# Patient Record
Sex: MALE | Race: WHITE
[De-identification: names, ages, dates, MRNs, and addresses within clinical notes are randomized per-mention and may not be internally consistent; named-entity substitution may affect disease eponyms.]

---

## 2018-12-13 ENCOUNTER — HOSPITAL ENCOUNTER (OUTPATIENT)
Dept: HOSPITAL 92 - LABBT | Age: 68
Discharge: HOME | End: 2018-12-13
Attending: SURGERY
Payer: MEDICARE

## 2018-12-13 DIAGNOSIS — N40.0: ICD-10-CM

## 2018-12-13 DIAGNOSIS — K40.90: ICD-10-CM

## 2018-12-13 DIAGNOSIS — Z01.818: Primary | ICD-10-CM

## 2018-12-13 DIAGNOSIS — Q53.9: ICD-10-CM

## 2018-12-13 LAB
ALBUMIN SERPL BCG-MCNC: 4.5 G/DL (ref 3.4–4.8)
ALP SERPL-CCNC: 65 U/L (ref 40–150)
ALT SERPL W P-5'-P-CCNC: 12 U/L (ref 8–55)
ANION GAP SERPL CALC-SCNC: 14 MMOL/L (ref 10–20)
AST SERPL-CCNC: 18 U/L (ref 5–34)
BASOPHILS # BLD AUTO: 0 THOU/UL (ref 0–0.2)
BASOPHILS NFR BLD AUTO: 0.6 % (ref 0–1)
BILIRUB SERPL-MCNC: 0.4 MG/DL (ref 0.2–1.2)
BUN SERPL-MCNC: 14 MG/DL (ref 8.4–25.7)
CALCIUM SERPL-MCNC: 9.8 MG/DL (ref 7.8–10.44)
CHLORIDE SERPL-SCNC: 106 MMOL/L (ref 98–107)
CO2 SERPL-SCNC: 25 MMOL/L (ref 23–31)
CREAT CL PREDICTED SERPL C-G-VRATE: 0 ML/MIN (ref 70–130)
EOSINOPHIL # BLD AUTO: 0.1 THOU/UL (ref 0–0.7)
EOSINOPHIL NFR BLD AUTO: 2.7 % (ref 0–10)
GLOBULIN SER CALC-MCNC: 2.5 G/DL (ref 2.4–3.5)
GLUCOSE SERPL-MCNC: 98 MG/DL (ref 80–115)
HGB BLD-MCNC: 14.4 G/DL (ref 14–18)
LYMPHOCYTES # BLD: 1.5 THOU/UL (ref 1.2–3.4)
LYMPHOCYTES NFR BLD AUTO: 32.6 % (ref 21–51)
MCH RBC QN AUTO: 32.3 PG (ref 27–31)
MCV RBC AUTO: 96.1 FL (ref 78–98)
MONOCYTES # BLD AUTO: 0.5 THOU/UL (ref 0.11–0.59)
MONOCYTES NFR BLD AUTO: 10.6 % (ref 0–10)
NEUTROPHILS # BLD AUTO: 2.5 THOU/UL (ref 1.4–6.5)
NEUTROPHILS NFR BLD AUTO: 53.5 % (ref 42–75)
PLATELET # BLD AUTO: 217 THOU/UL (ref 130–400)
POTASSIUM SERPL-SCNC: 4.7 MMOL/L (ref 3.5–5.1)
RBC # BLD AUTO: 4.46 MILL/UL (ref 4.7–6.1)
SODIUM SERPL-SCNC: 140 MMOL/L (ref 136–145)
WBC # BLD AUTO: 4.6 THOU/UL (ref 4.8–10.8)

## 2018-12-13 PROCEDURE — 93010 ELECTROCARDIOGRAM REPORT: CPT

## 2018-12-13 PROCEDURE — 80053 COMPREHEN METABOLIC PANEL: CPT

## 2018-12-13 PROCEDURE — 85025 COMPLETE CBC W/AUTO DIFF WBC: CPT

## 2018-12-13 PROCEDURE — 93005 ELECTROCARDIOGRAM TRACING: CPT

## 2018-12-13 PROCEDURE — 84153 ASSAY OF PSA TOTAL: CPT

## 2018-12-18 ENCOUNTER — HOSPITAL ENCOUNTER (OUTPATIENT)
Dept: HOSPITAL 92 - SDC | Age: 68
Discharge: HOME | End: 2018-12-18
Attending: SURGERY
Payer: MEDICARE

## 2018-12-18 VITALS — BODY MASS INDEX: 23.1 KG/M2

## 2018-12-18 DIAGNOSIS — Q53.9: ICD-10-CM

## 2018-12-18 DIAGNOSIS — K40.90: Primary | ICD-10-CM

## 2018-12-18 PROCEDURE — 0YU60JZ SUPPLEMENT LEFT INGUINAL REGION WITH SYNTHETIC SUBSTITUTE, OPEN APPROACH: ICD-10-PCS | Performed by: SURGERY

## 2018-12-18 PROCEDURE — 49505 PRP I/HERN INIT REDUC >5 YR: CPT

## 2018-12-18 PROCEDURE — 96374 THER/PROPH/DIAG INJ IV PUSH: CPT

## 2018-12-18 PROCEDURE — C1781 MESH (IMPLANTABLE): HCPCS

## 2018-12-18 PROCEDURE — 0VTB0ZZ RESECTION OF LEFT TESTIS, OPEN APPROACH: ICD-10-PCS | Performed by: SURGERY

## 2018-12-18 PROCEDURE — 88305 TISSUE EXAM BY PATHOLOGIST: CPT

## 2018-12-18 PROCEDURE — 54520 REMOVAL OF TESTIS: CPT

## 2018-12-18 NOTE — OP
DATE OF PROCEDURE:  12/18/2018



PREOPERATIVE DIAGNOSIS:  Left inguinal hernia with undescended testicle.



PROCEDURES PERFORMED:  Left orchiectomy, left inguinal hernia repair with mesh.



INDICATIONS:  This is a 68-year-old male, who has a known undescended testicle since

birth.  He developed a large bulge in his left groin.  Per Urology, best to remove

that testicle. 



FINDINGS:  Large direct inguinal hernia, left testicle.



DESCRIPTION OF PROCEDURE:  After informed consent was obtained, the patient was

taken to the operating room, given general endotracheal anesthesia, placed in supine

position.  Left groin was prepped and draped in the usual fashion.  Local anesthesia

infiltrated subcutaneously and deep.  A transverse left inguinal incision was

performed, subcu divided sharply.  The fascia of the external oblique was incised in

direction of its fibers through the external ring.  The testicle was just underneath

the external oblique fascia.  External oblique fascia had been opened in the

direction of its fibers.  The testicle was dissected out including the spermatic

cord.  The spermatic cord was divided between clamps and tied with 0 silk suture.

Then, this was sent to pathology for further analysis.  The direct hernia sac was

circumscribed, reduced and the PHS hernia system, posteriorly was placed in the

preperitoneal space, anterior was laid out, sutured to the pubic tubercle medially,

tucked under the external oblique fascia laterally.  Due to the large size of this

defect, also tacked it laterally to the inguinal ligament with interrupted 2-0

Prolene suture.  Then, the external oblique fascia was closed over the mesh with a

running 3-0 Vicryl.  Carlos was closed with interrupted 3-0 Vicryl and the skin was

closed with a running subcuticular 4-0 Rapide.  Steri-Strips applied.  Sterile

bandage applied.  The patient tolerated the procedure well and transferred to

Recovery in good condition.  Sponge and needle count verified and correct x2. 







Job ID:  [id number]